# Patient Record
Sex: FEMALE | Race: WHITE | NOT HISPANIC OR LATINO | Employment: OTHER | URBAN - METROPOLITAN AREA
[De-identification: names, ages, dates, MRNs, and addresses within clinical notes are randomized per-mention and may not be internally consistent; named-entity substitution may affect disease eponyms.]

---

## 2019-10-10 PROBLEM — M16.11 PRIMARY OSTEOARTHRITIS OF RIGHT HIP: Status: ACTIVE | Noted: 2019-10-10

## 2019-10-10 PROBLEM — I48.20 CHRONIC ATRIAL FIBRILLATION (HCC): Status: ACTIVE | Noted: 2019-10-10

## 2019-10-10 PROBLEM — E66.9 OBESITY (BMI 30-39.9): Status: ACTIVE | Noted: 2019-10-10

## 2019-10-10 PROBLEM — G89.29 CHRONIC RIGHT HIP PAIN: Status: ACTIVE | Noted: 2019-10-10

## 2019-10-10 PROBLEM — M25.551 CHRONIC RIGHT HIP PAIN: Status: ACTIVE | Noted: 2019-10-10

## 2019-10-10 PROBLEM — E04.2 MULTIPLE THYROID NODULES: Status: ACTIVE | Noted: 2019-10-10

## 2019-12-17 PROBLEM — I73.9 PERIPHERAL VASCULAR DISEASE (HCC): Status: ACTIVE | Noted: 2017-11-10

## 2019-12-17 PROBLEM — I48.91 ATRIAL FIBRILLATION (HCC): Status: ACTIVE | Noted: 2018-07-19

## 2022-05-12 PROBLEM — E65 ABDOMINAL PANNUS: Status: ACTIVE | Noted: 2022-05-12

## 2022-05-12 PROBLEM — E03.8 SUBCLINICAL HYPOTHYROIDISM: Status: ACTIVE | Noted: 2022-05-12

## 2022-05-12 PROBLEM — H02.409 DROOPING EYELID: Status: ACTIVE | Noted: 2022-05-12

## 2022-11-10 PROBLEM — Z00.00 MEDICARE ANNUAL WELLNESS VISIT, SUBSEQUENT: Status: ACTIVE | Noted: 2022-11-10

## 2023-07-14 PROBLEM — E89.0 POSTOPERATIVE HYPOTHYROIDISM: Status: ACTIVE | Noted: 2022-05-12

## 2023-07-14 PROBLEM — E03.9 HYPOTHYROIDISM: Status: ACTIVE | Noted: 2022-05-12

## 2023-07-14 PROBLEM — E03.8 SUBCLINICAL HYPOTHYROIDISM: Status: RESOLVED | Noted: 2022-05-12 | Resolved: 2023-07-14

## 2023-07-14 PROBLEM — M85.80 OSTEOPENIA: Status: ACTIVE | Noted: 2023-07-14

## 2023-08-22 PROBLEM — Z01.818 PRE-OP EXAM: Status: ACTIVE | Noted: 2023-08-22

## 2023-08-22 PROBLEM — R79.89 ABNORMAL CBC: Status: ACTIVE | Noted: 2023-08-22

## 2023-11-22 PROBLEM — D64.9 ANEMIA: Status: ACTIVE | Noted: 2023-11-22

## 2023-12-22 PROBLEM — D68.69 HYPERCOAGULABLE STATE DUE TO PERMANENT ATRIAL FIBRILLATION (HCC): Status: ACTIVE | Noted: 2023-12-22

## 2023-12-22 PROBLEM — I48.21 HYPERCOAGULABLE STATE DUE TO PERMANENT ATRIAL FIBRILLATION (HCC): Status: ACTIVE | Noted: 2023-12-22

## 2024-05-08 PROBLEM — M25.572 CHRONIC PAIN OF LEFT ANKLE: Status: ACTIVE | Noted: 2024-05-08

## 2024-05-08 PROBLEM — G89.29 CHRONIC PAIN OF LEFT ANKLE: Status: ACTIVE | Noted: 2024-05-08

## 2024-08-21 ENCOUNTER — TELEPHONE (OUTPATIENT)
Dept: CARDIOLOGY | Facility: MEDICAL CENTER | Age: 74
End: 2024-08-21
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
## 2024-08-21 NOTE — TELEPHONE ENCOUNTER
RICHARD    Caller: Sheryl with [facility]    Topic/issue: Calling to follow up on surgery clearance sent to our office on 08/06/24 for surgery on 09/17/24.    See scanned form on 08/09/24 (this appears to be it?)    Requesting clearance be completed and sent back to:  Fax: [phone]    Callback Number: [phone]     Thank you,  Cinda DAVIDSON

## 2024-08-22 NOTE — TELEPHONE ENCOUNTER
Clearance letter has been faxed. Confirmation report has been received and scanned into chart. Thank you.